# Patient Record
Sex: MALE
[De-identification: names, ages, dates, MRNs, and addresses within clinical notes are randomized per-mention and may not be internally consistent; named-entity substitution may affect disease eponyms.]

---

## 2020-05-16 ENCOUNTER — HOSPITAL ENCOUNTER (EMERGENCY)
Dept: HOSPITAL 88 - FSED | Age: 25
Discharge: HOME | End: 2020-05-16
Payer: COMMERCIAL

## 2020-05-16 VITALS — HEIGHT: 65 IN | BODY MASS INDEX: 43.32 KG/M2 | WEIGHT: 260 LBS

## 2020-05-16 DIAGNOSIS — R42: Primary | ICD-10-CM

## 2020-05-16 DIAGNOSIS — H66.92: ICD-10-CM

## 2020-05-16 DIAGNOSIS — E86.0: ICD-10-CM

## 2020-05-16 DIAGNOSIS — F41.9: ICD-10-CM

## 2020-05-16 DIAGNOSIS — E87.6: ICD-10-CM

## 2020-05-16 PROCEDURE — 99284 EMERGENCY DEPT VISIT MOD MDM: CPT

## 2020-05-16 PROCEDURE — 93005 ELECTROCARDIOGRAM TRACING: CPT

## 2020-05-16 PROCEDURE — 80053 COMPREHEN METABOLIC PANEL: CPT

## 2020-05-16 PROCEDURE — 85025 COMPLETE CBC W/AUTO DIFF WBC: CPT

## 2020-05-16 NOTE — XMS REPORT
Encounter CCD: 2012 to 2012

                             Created on: 2054



LE KEY

External Reference #: [id number]

: 1995

Sex: Male



Demographics





                          Address                   [address] [place]  [address]-

 

                          Home Phone                [phone]

 

                          Preferred Language        Unknown

 

                          Marital Status            Single

 

                          Druze Affiliation     None

 

                          Race                      Other

 

                          Ethnic Group              /Latin





Author





                          Author                    [employer]              [facility]

 

                          Address                   Unknown

 

                          Phone                     Unavailable







Care Team Providers





                    Care Team Member Name Role                Phone

 

                    Fletcher Thornton CP                  [phone]







Allergies, Adverse Reactions, Alerts





  



                     Substance           Reaction            Status

 

  



                           NKDA                      Active







Medications





    



              Medication   Instructions  Start Date   End Date     Status

 

    



                 Tylenol with Codeine  10 cc, PO, Q4H, PRN, 240 mL, pain,        Ordered



                           120 mg-12 mg/5 mL         Substitution Allowed, Maint

enance   



                                         oral liquid    

 

    



              Toradol 30 mg/mL  30 mg, Route: IM, Drug form: INJ,  2012   

Completed



                           injectable solution       ONCE, Start date: 12 

8:37:00,   



                                         Stop date: 12 8:37:00   







Vital Signs





                          Most recent to oldest [Reference Range]: 1

 

                          Height                    165.10 cm 

                                        (2012 06:39:00)  

 

                          Weight                    92.727 kg 

                                        (2012 06:39:00)  







Results





URINALYSIS



                          Most recent to oldest [Reference Range]: 1

 

                          UA Turbidity [Clear]      Clear 

                                        (2012 06:55:00)  

 

                          UA Color [Yellow]         Yellow 

*NA*

                                        (2012 06:55:00)  

 

                          UA pH [5.0-8.0]           7.5 

                                        (2012 06:55:00)  

 

                          UA Spec Grav [<=1.030]    1.010 

                                        (2012 06:55:00)  

 

                          UA Glucose [Negative]     Negative 

                                        (2012 06:55:00)  

 

                          UA Blood [Negative]       Large 

*ABN*

                                        (2012 06:55:00)  

 

                          UA Ketones [Negative]     Negative 

*NA*

                                        (2012 06:55:00)  

 

                          UA Protein [Negative]     Trace 

*ABN*

                                        (2012 06:55:00)  

 

                          UA Urobilinogen [0.1-1.0 EU/dL] 0.2 EU/dL 

                                        (2012 06:55:00)  

 

                          UA Bili [Negative]        Negative 

*NA*

                                        (2012 06:55:00)  

 

                          UA Leuk Est [Negative]    Negative 

                                        (2012 06:55:00)  

 

                          UA Nitrite [Negative]     Negative 

                                        (2012 06:55:00)  

 

                          UA WBC [None Seen]        None Seen 

                                        (2012 06:55:00)  

 

                          UA RBC [0-2 /HPF]         6-10 /HPF 

*ABN*

                                        (2012 06:55:00)  

 

                          UA Bacteria [None Seen /HPF] Occasional /HPF 

                                        (2012 06:55:00)  

 

                          UA Sq Epi [Few /LPF]      Few /LPF 

                                        (2012 06:55:00)

## 2020-05-16 NOTE — XMS REPORT
Continuity of Care Document

                             Created on: 2020



LE KEY

External Reference #: [id number]

: 1995

Sex: Male



Demographics





                          Address                   [address] [place] [address]  [address]

 

                          Home Phone                [phone]

 

                          Preferred Language        English

 

                          Marital Status            Unknown

 

                          Yazdanism Affiliation     Unknown

 

                          Race                      Unknown

 

                          Ethnic Group              Unknown





Author





                          Author                    Spencer Keepio

 LE Valdovinos              [email or web address]

 

                          Address                   Unknown

 

                          Phone                     Unavailable







Care Team Providers





                    Care Team Member Name Role                Phone

 

                    [email or web address] Unavailable         Un

available



                                    



Problems

                    



                    Problem                         Status                      

   Onset Date       

                          Classification                         Date Reported  

         

                          Comments                         Source               

     

 

                    Tachycardia, unspecified                                    

              

2020         

 

                                                    [facility]     

             

 

 

                    Fever, unspecified                                          

        2020                  

     

                                                    [facility]     

               

 

                    SVT                         Active                         0

2020           

                                                                                

       

                                        [facility]                 

   

 

                          Discharge Diagnosis: Food poisoning                   

                          

                    2016      

                                                    [facility]                

    

 

                          Discharge Diagnosis: Acute infective gastroenteritis  

                          

                          2016                                                  

[facility]        

           

 

                    S.O.B                         Active                        

 2016         

                                                                                

     

                                        [facility]                    

 

                    VOMITING                         Active                     

    2013      

                                                                                

  

                                        [facility]                    

 

                    KIDNEY STONES                         Active                

         2012 

                                                                                

                                        [facility]                    

 

                    LT SIDE  PAIN                         Active                

         2012 

                                                                                

                                        [facility]                    

 

                    HEADACHE                         Active                     

    2012      

                                                                                

  

                                        [facility]                 

   



                                                                                
                                                                                
                                                      



Medications

                    



                    Medication                         Details                  

       Route        

                          Status                         Patient Instructions   

          

                          Ordering Provider                         Order Date  

               

                                        Source                    

 

                          Isolyte S PH-7.4 (Bolus) IV                         1,

000 mL, Route: IV, ONCE, 

Priority: STAT, Dosing Weight 118.182 kg, Start date: 20 16:36:00 CST, 
Stop date: 20 16:36:00 CST                                                

                    Inactive                                                    

                   

                          2020                         [facility]

nter                   



 

                          Isolyte S PH-7.4 (Bolus) IV                         No

candace: (Same as: Isolyte S 

PH7.4, Normosol-R PH 7.4, Plasma-Lyte A )                                       

                    Inactive                                                    

          

                          2020                         [facility]

nter          

         

 

                          Isolyte S PH-7.4 (Bolus) IV                         No

candace: (Same as: Isolyte S 

PH7.4, Normosol-R PH 7.4, Plasma-Lyte A )                                       

                    Inactive                                                    

          

                          2020                         [facility]

nter          

         

 

                          Tylenol                         Notes: Max acetaminoph

en 4000 mg/day (4 gm/day).

 (Same as: Tylenol Extra Strength)                                              

                    Inactive                                                    

                 

                          2020                         [facility]

nter                 

  

 

                          Sodium Chloride 0.154 MEQ/ML Injectable Solution      

                   1,000 

mL, 1,000 ml/hr, Infuse Over: 1 Hour, Route: IV, ONCE, Priority: STAT, Dosing 
Weight 100 kg, Start date: 16 21:21:00, Duration: 1 doses or times, Stop 
date: 16 21:21:00                                                   

Inactive                                                                        

 

                          2016                         [facility]       

             

 

                          Sodium Chloride 0.154 MEQ/ML Injectable Solution      

                   1,000 

mL, 1,000 ml/hr, Infuse Over: 1 hr, Route: IV, ONCE, Priority: STAT, Dosing 
Weight 100 kg, Start date: 16 21:12:00, Duration: 1 doses or times, Stop 
date: 16 21:12:00                                                   

Inactive                                                                        

 

                          2016                         [facility]       

             

 

                                        Atropine Sulfate 0.025 MG / Diphenoxylat

e Hydrochloride 2.5 MG Oral Tablet 

[Lomotil]                               1 tab, PO, QID, PRN for loose stool, X 7

 day, 

# 28 tab, 0 Refill(s)                                                   Active  

 

                                                                        

2016                              [facility]                    

 

                          promethazine 25 mg oral tablet                        

 25 mg = 1 tab, PO, Q8H, 

PRN Nausea/Vomiting, X 10 day, # 30 tab, 0 Refill(s)                            

                      Active                                                    

                          2016                         [facility]       

   

          

 

                    Bentyl                         Notes: (Same as: Bentyl)     

                    

                          Inactive                                              

 

                                             2016                         

[facility]     

               

 

                          Morphine                         Notes: (Same as:MORPh

ine Sulfate)              

                                             Inactive                           

         

                                             2016                         

[facility]                    

 

                          Ondansetron                         Notes: (Same as: CHIOMA luna)   *** MEDICATION 

WASTE *** Product Size:  4 mg Product Wasted:  ___ mg                           

                          Inactive                                              

   

                                             2016                         

[facility]       

             

 

                          Sodium Chloride 0.154 MEQ/ML Injectable Solution      

                   1,000 

mL, 1000 ml/hr, Infuse Over: 1 hr, Route: IV, 1,000, Drug form: INJ, ONCE, 
Priority: STAT, Dosing Weight 100 kg, Start date: 16 16:20:00, Duration: 1
 doses or times, Stop date: 16 16:20:00                                   

                    Inactive                                                    

     

                          2016                         [facility]       

        

     

 

                          Saline Flush 0.9%                         Notes: (Same

 as: BD Posiflush)        

                                                    No Longer Active            

          

                                                                      2016

                 

                                        [facility]                    

 

                          Zofran ODT 4 mg oral tablet, disintegrating           

              4 mg = 1 

tab, PO, BID, Nausea and Vomiting, Dissolve tab under tongue, # 10 tab, 0 
Refill(s)Dissolve tab under tongue                                              

                    Active                                                  Joanna dexter             

                          2013                         [facility]       

             

 

                          Zofran                         4 mg, 2 mL, Route: IVP,

 Drug form: INJ, ONCE, 

Dosing Weight 87.273, kg, Priority: STAT, Start date: 13 5:34:00, Stop 
date: 13 5:34:00(Same as: Zofran)                                         

                    Inactive                                                  

wesley      

                          2013                         [facility]       

       

      

 

                          Sodium Chloride 0.9% (Bolus) IV 1,000 mL              

           1,000 mL, Rate:

 1,000 ml/hr, Infuse over: 1 hr, Route: IV, Dosing Weight 87.273 kg, Total 
Volume: 1,000, Priority: STAT, Start date: 13 5:34:00, Duration: 1 doses 
or times, Stop date: 13 6:33:00, Bolus DoseBolus Dose                     
                                             Inactive                           

                

                    [facility]                         2013                  

       [facility]                    

 

                          Toradol 30 mg/mL injectable solution                  

       60 mg, Route: IM, 

Drug form: INJ, ONCE, Start date: 12 21:41:00, Stop date: 12 
21:41:00                         IM                         No Longer Active    

                                              Rastafari                         

2012                              [facility]                    

 

                          Toradol 30 mg/mL injectable solution                  

       30 mg, Route: IV, 

Drug form: INJ, ONCE, Start date: 12 21:35:00, Stop date: 12 
21:35:00                         IV                         No Longer Active    

                                              Rastafari                         

2012                              [facility]                    

 

                          Toradol 10 mg oral tablet                         10 m

g, 1 tab, PO, Q4H, PRN, 30

 tab, Pain, Substitution Allowed, TAB                         PO                

                    Active                                                  Susana

          

                          2012                         [facility]       

           

  

 

                          Flomax 0.4 mg oral capsule                         0.4

 mg, 1 cap, PO, Daily, 10 

cap, Substitution Allowed, CAP                         PO                       

                    Active                                                  Susana

                 

                          2012                         [facility]       

             

 

                          Norco 5/325 oral tablet                         1-2 ta

b, PO, Q4-6H, PRN, 30 tab,

 Pain, Substitution Allowed, Maintenance                         PO             

                    Active                                                  Susana

m       

                          2012                         [facility]       

        

     

 

                          Tylenol with Codeine 120 mg-12 mg/5 mL oral liquid    

                     10 

cc, PO, Q4H, PRN, 240 mL, pain, Substitution Allowed, Maintenance               
                    PO                         Active                           

          

                    Nriagu                         2012                   

      

 [facility]                    

 

                          Toradol 30 mg/mL injectable solution                  

       30 mg, Route: IM, 

Drug form: INJ, ONCE, Start date: 12 8:37:00, Stop date: 12 8:37:00 
                          IM                         No Longer Active           

  

                                             Nriagu                         03/0

3/2012  

                                         [facility]                    



                                                                                
                                                                                
                                                                                
                                                                                
                                                                                
                                    



Allergies, Adverse Reactions, Alerts

                    



                    Substance                         Category                  

       Reaction     

                          Severity                         Reaction type        

      

                    Status                         Date Reported                

         

Comments                                Source                    

 

                          No Known Medication Allergies                         

Assertion                 

                                                                      Drug aller

gy          

                                                                                

     

                                        [facility]                 

   



                                                        



Immunizations

        



                                        No Data Provided for This Section



                                    



Results





                    Order Name                         Results                  

       Value        

                          Reference Range                         Date          

         

                    Interpretation                         Comments             

            

Source                    

 

                ELECTROLYTES                         AGAP            16.7       

                  10.0 - 

20.0                         2020                                         

                                                    [facility]     

               

 

                ELECTROLYTES                         Glucose Lvl     116        

                 70 

- 99                         2020                                         

                                                    [facility]     

               

 

                ELECTROLYTES                         BUN             14         

                7 - 22      

                    2020                                                  

 

                                        [facility]                 

   

 

                    ELECTROLYTES                         Creatinine Lvl      1.0

6                        

                    0.50 - 1.40                         2020              

                   

                                                    [facility]     

          

     

 

                ELECTROLYTES                         Sodium Lvl      137        

                 135 

- 145                         2020                                        

                                                    [facility]     

               

 

                ELECTROLYTES                         Potassium Lvl   3.7        

                 

3.5 - 5.1                         2020                                    

                                                    [facility]     

             

  

 

                ELECTROLYTES                         Chloride Lvl    103        

                 95

 - 109                         2020                                       

                                                    [facility]     

               

 

                ELECTROLYTES                         CO2             21         

                24 - 32     

                    2020                                                  

                                        [facility]                 

   

 

                ELECTROLYTES                         Calcium Lvl     8.3        

                 8.5

 - 10.5                         2020                                      

                                                    [facility]     

               

 

                ELECTROLYTES                         eGFR            97         

                           

                    2020                                                  

Result

 Comment: The eGFR is calculated using the CKD-EPI formula. In most young, 
healthy individuals the eGFR will be >90 mL/min/1.73m2. The eGFR declines with 
age. An eGFR of 60-89 may be normal in some populations, particularly the 
elderly, for whom the CKD-EPI formula has not been extensively validated. Use of
 the eGFR is not recommended in the following populations:<br/><br/>Individuals 
with unstable creatinine concentrations, including pregnant patients and those 
with serious co-morbid conditions.<br/><br/>Patients with extremes in muscle 
mass or diet. <br/><br/>The data above are obtained from the National Kid[employer] 
Disease Education Program (NKDEP) which additionally recommends that when the 
eGFR is used in patients with extremes of body mass index for purposes of drug 
dosing, the eGFR should be multiplied by the estimated BMI.                     
                                        [facility]                 

   

 

                HEMATOLOGY                         WBC             8.5          

               3.7 - 10.4   

                          2020                                            

    

                                                    [facility]     

               

 

                HEMATOLOGY                         RBC             4.95         

                4.70 - 6.10 

                          2020                                            

  

                                                    [facility]     

               

 

                HEMATOLOGY                         Hgb             14.9         

                14.0 - 18.0 

                          2020                                            

  

                                                    [facility]     

               

 

                HEMATOLOGY                         Hct             42.5         

                42.0 - 54.0 

                          2020                                            

  

                                                    [facility]     

               

 

                HEMATOLOGY                         MCV             85.8         

                80.0 - 94.0 

                          2020                                            

  

                                                    [facility]     

               

 

                HEMATOLOGY                         MCH             30.0         

                27.0 - 31.0 

                          2020                                            

  

                                                    [facility]     

               

 

                HEMATOLOGY                         MCHC            34.9         

                32.0 - 36.0

                          2020                                            

 

                                                    [facility]     

               

 

                HEMATOLOGY                         RDW             13.8         

                11.5 - 14.5 

                          2020                                            

  

                                                    [facility]     

               

 

                HEMATOLOGY                         Platelet        201          

               133 - 

450                         2020                                          

                                                    [facility]     

               

 

                HEMATOLOGY                         MPV             8.0          

               7.4 - 10.4   

                          2020                                            

    

                                                    [facility]     

               

 

                HEMATOLOGY                         Segs            73.7         

                45.0 - 75.0

                          2020                                            

 

                                                    [facility]     

               

 

                HEMATOLOGY                         Lymphocytes     13.8         

                20.0

 - 40.0                         2020                                      

                                                    [facility] [facility]     

               

 

                HEMATOLOGY                         Monocytes       10.7         

                2.0 - 

12.0                         2020                                         

                                                    [facility]     

               

 

                HEMATOLOGY                         Eosinophils     1.1          

               0.0 -

 4.0                         2020                                         

                                                    [facility]     

               

 

                HEMATOLOGY                         Basophils       0.7          

               0.0 - 

1.0                         2020                                          

                                                    [facility]     

               

 

                HEMATOLOGY                         Neutrophils #   6.3          

               1.5

 - 8.1                         2020                                       

                                                    [facility]     

               

 

                HEMATOLOGY                         Lymphocytes #   1.2          

               1.0

 - 5.5                         2020                                       

                                                    [facility]     

               

 

                HEMATOLOGY                         Monocytes #     0.9          

               0.0 -

 0.8                         2020                                         

                                                    [facility]     

               

 

                HEMATOLOGY                         Eosinophils #   0.1          

               0.0

 - 0.5                         2020                                       

                                                    [facility]     

               

 

                HEMATOLOGY                         Basophils #     0.1          

               0.0 -

 0.2                         2020                                         

                                                    [facility]     

               

 

                CHEM PANEL                         Lipase Lvl      116          

               73 - 

393                         2016                                          

                                                    [facility]                

    

 

                CHEM PANEL                         eGFR            99           

                           

                    2016                                                  

Result 

Comment: The eGFR is calculated using the CKD-EPI formula. In most young, 
healthy individuals the eGFR will be >90 mL/min/1.73m2. The eGFR declines with 
age. An eGFR of 60-89 may be normal in some populations, particularly the 
elderly, for whom the CKD-EPI formula has not been extensively validated. Use of
 the eGFR is not recommended in the following populations:<br/><br/>Individuals 
with unstable creatinine concentrations, including pregnant patients and those 
with serious co-morbid conditions.<br/><br/>Patients with extremes in muscle 
mass or diet. <br/><br/>The data above are obtained from the National Kid[employer] 
Disease Education Program (NKDEP) which additionally recommends that when the 
eGFR is used in patients with extremes of body mass index for purposes of drug 
dosing, the eGFR should be multiplied by the estimated BMI.                     
                                         [place]                    

 

                CHEM PANEL                         CO2             23           

              24 - 32       

                    2016                                                  

  

                                        [facility]                    

 

                CHEM PANEL                         Calcium Lvl     9.1          

               8.5 -

 10.5                         2016                                        

                                                    [facility]                

    

 

                CHEM PANEL                         Total Protein   8.7          

               6.4

 - 8.4                         2016                                       

                                                    [facility]                

    

 

                CHEM PANEL                         Chloride Lvl    104          

               95 -

 109                         2016                                         

                                                    [facility]                

    

 

                CHEM PANEL                         Potassium Lvl   4.0          

               3.5

 - 5.1                         2016                                       

                                                    [facility]                

    

 

                CHEM PANEL                         Creatinine Lvl  1.07         

                

0.50 - 1.40                         2016                                  

                                                    [facility]                

    

 

                CHEM PANEL                         Sodium Lvl      136          

               135 - 

145                         2016                                          

                                                    [facility]                

    

 

                CHEM PANEL                         Glucose Lvl     118          

               70 - 

99                         2016                                           

                                                    [facility]                

    

 

                CHEM PANEL                         BUN             19           

              7 - 22        

                    2016                                                  

   

                                         [facility]                    

 

                CHEM PANEL                         ALT             58           

              0 - 65        

                    2016                                                  

   

                                        [facility]                    

 

                CHEM PANEL                         AST             20           

              0 - 37        

                    2016                                                  

   

                                        [facility]                    

 

                CHEM PANEL                         Alk Phos        149          

               39 - 136

                          2016                                            

 

                                                    [facility]                

    

 

                CHEM PANEL                         Bili Total      1.0          

               0.2 - 

1.3                         2016                                          

                                                    [facility]                

    

 

                CHEM PANEL                         Albumin Lvl     4.5          

               3.5 -

 5.0                         2016                                         

                                                    [facility]                

    

 

                CHEM PANEL                         B/C Ratio       18           

              6 - 25  

                          2016                                            

   

                                                    [facility]                

    

 

                CHEM PANEL                         Globulin        4.2          

               2.0 - 

4.0                         2016                                          

                                                    [facility]                

    

 

                CHEM PANEL                         A/G Ratio       1.1          

               0.7 - 

1.6                         2016                                          

                                                    [facility]                

    

 

                CHEM PANEL                         AGAP            13.0         

                10.0 - 20.0

                          2016                                            

 

                                                    [facility]                

    

 

                HEMATOLOGY                         Monocytes       4.5          

               2.0 - 

12.0                         2016                                         

                                                    [facility]                

    

 

                HEMATOLOGY                         Eosinophils     0.3          

               0.0 -

 4.0                         2016                                         

                                                    [facility]                

    

 

                HEMATOLOGY                         Basophils       0.2          

               0.0 - 

1.0                         2016                                          

                                                    [facility]                

    

 

                HEMATOLOGY                         Lymphocytes     5.1          

               20.0 

- 40.0                         2016                                       

                                                    [facility]                

    

 

                HEMATOLOGY                         Segs            89.9         

                45.0 - 75.0

                          2016                                            

 

                                                    [facility]                

    

 

                HEMATOLOGY                         Lymphocytes #   0.9          

               1.0

 - 5.5                         2016                                       

                                                    [facility]                

    

 

                HEMATOLOGY                         Monocytes #     0.8          

               0.0 -

 0.8                         2016                                         

                                                    [facility]                

    

 

                HEMATOLOGY                         Segs-Bands #    15.4         

                1.5

 - 8.1                         2016                                       

                                                    [facility]                         MPV             8.2          

               7.4 - 10.4   

                          2016                                            

    

                                                    [facility]                         RDW             13.7         

                11.5 - 14.5 

                          2016                                            

  

                                                    [facility]                         MCHC            33.8         

                32.0 - 36.0

                          2016                                            

 

                                                    [facility]                         Platelet        261          

               133 - 

450                         2016                                          

                                                    [facility]                         MCH             28.5         

                27.0 - 31.0 

                          2016                                            

  

                                                    [facility]                         MCV             84.4         

                80.0 - 94.0 

                          2016                                            

  

                                                    [facility]                         Hgb             17.7         

                14.0 - 18.0 

                          2016                                            

  

                                                    [facility]                

    

 

                HEMATOLOGY                         Hct             52.3         

                42.0 - 54.0 

                          2016                                            

  

                                                    [facility]                         WBC             17.1         

                3.7 - 10.4  

                          2016                                            

   

                                                    [facility]                         RBC             6.20         

                4.70 - 6.10 

                          2016                                            

  

                                                    [facility]                

    

 

                    URINALYSIS                         UA Bacteria         Occas

ional /HPF 

                          (2012 06:55:00)                           None S

een                       

                    2012                         Normal                   

                  

                                        [facility]                    

 

                    URINALYSIS                         UA RBC              6-10 

/HPF 

*ABN*

                    (2012 06:55:00)                           0 - 2       

                  

2012                         ABN                                          

                                        [facility]                    

 

                    URINALYSIS                         UA WBC              None 

Seen 

                          (2012 06:55:00)                           None S

een                       

                    2012                         Normal                   

                  

                                        [facility]                    

 

                    URINALYSIS                         UA Sq Epi           Few /

LPF 

                    (2012 06:55:00)                           Few         

                

2012                         Normal                                       

                                        [facility]                    

 

                URINALYSIS                         UA Spec Grav    1.010        

                 

<=1.030                         2012                         Normal       

                                                    [facility]                

    

 

                URINALYSIS                         UA pH           7.5          

               5.0 - 8.0  

                          2012                         Normal             

   

                                                    [facility]                

    

 

                    URINALYSIS                         UA Protein          Trace

 

*ABN*

                          (2012 06:55:00)                           Negati

ve                        

                    2012                         ABN                      

                   

                                        [facility]                    

 

                    URINALYSIS                         UA Glucose          Negat

bo 

                          (2012 06:55:00)                           Negati

ve                        

                    2012                         Normal                   

                   

                                        [facility]                    

 

                    URINALYSIS                         UA Ketones          Negat

bo 

*NA*

                          (2012 06:55:00)                           Negati

ve                        

                    2012                         NA                       

                   

                                        [facility]                    

 

                    URINALYSIS                         UA Bili             Negat

bo 

*NA*

                          (2012 06:55:00)                           Negati

ve                        

                    2012                         NA                       

                   

                                        [facility]                    

 

                    URINALYSIS                         UA Blood            Large

 

*ABN*

                          (2012 06:55:00)                           Negati

ve                        

                    2012                         ABN                      

                   

                                        [facility]                    

 

                    URINALYSIS                         UA Leuk Est         Negat

bo 

                          (2012 06:55:00)                           Negati

ve                        

                    2012                         Normal                   

                   

                                        [facility]                    

 

                    URINALYSIS                         UA Nitrite          Negat

bo 

                          (2012 06:55:00)                           Negati

ve                        

                    2012                         Normal                   

                   

                                        [facility]                    

 

                URINALYSIS                         UA Urobilinogen 0.2          

               

0.1 - 1.0                         2012                         Normal     

                                                    [facility]                

    

 

                    URINALYSIS                         UA Color            Yello

w 

*NA*

                    (2012 06:55:00)                           Yellow      

                   

2012                         NA                                           

                                        [facility]                    

 

                    URINALYSIS                         UA Turbidity        Clear

 

                    (2012 06:55:00)                           Clear       

                  

2012                         Normal                                       

                                        [facility]                    



                                                                                
                                                                                
                                                                                
                                                                                
                                                                                
                                                                                
                                                                                
                                                                                
                                                                                
                                                



Pathology Reports





                                        No Data Provided for This Section       

             



                                                



Diagnostic Reports

                    



                    Report                         Value                        

 Date               

                                        Source                    

 

                          Chest 1view DX                         EXAM: XR CHEST 

1 VIEW

DATE: 1/3/2020 at 1420 hours

 

INDICATION:  - cough

COMPARISON: 2016

TECHNIQUE: Upright AP chest.

FINDINGS: 

Lines, tubes and hardware: None.

 

Lungs and pleura: Pulmonary vascularity is normal. The lungs are clear. The 
costophrenic sulci are sharp without effusion. No pneumothorax is identified.

Heart and mediastinum: The heart size is normal for technique. The mediastinal 
contours are normal. 

Bones, soft tissues: No acute abnormality.

IMPRESSION:  

No acute abnormality.



                          2020                         [facility]                    

 

                          Chest 2 views DX                         Patient Name:

 LE KEY

Age: 20 years  y/o Male

MR: [id number]

Study: Chest 2 views DX dated 2016.

Clinical Indication: Shortness of Breath

Comparison: 2010.

Cardiac and mediastinal structures are stable. 

No focal infiltrate identified within the lungs, no edema, no pleural effusions 
and no pneumothorax.





SL:  [id number]

                          2016                         [facility]       

 

            



                                                                                
    



Consultation Notes

                    



                                        No Data Provided for This Section       

             



                                                            



Discharge Summaries

                    



                                        No Data Provided for This Section       

             



                                                            



History and Physicals

                    



                                        No Data Provided for This Section       

             



                                                                



Vital Signs

                     



                    Vital Sign                         Value                    

     Date           

                          Comments                         Source               

     

 

                    Temperature Oral (F)                         100.4 F        

                 

2020                                                   [facility]                    

 

                    Respitory Rate                         16                   

       2020   

                                                    [facility]     

     

          

 

                    Systolic (mm Hg)                         147                

          2020

                                                    [facility]     

  

             

 

                    Diastolic (mm Hg)                         66                

          2020

                                                    [facility]     

  

             

 

                    Respitory Rate                         15                   

       2020   

                                                    [facility]     

     

          

 

                    Systolic (mm Hg)                         142                

          2020

                                                    [facility]     

  

             

 

                    Diastolic (mm Hg)                         59                

          2020

                                                    [facility]     

  

             

 

                    Respitory Rate                         17                   

       2020   

                                                    [facility]     

     

          

 

                    Systolic (mm Hg)                         146                

          2020

                                                    [facility]     

  

             

 

                    Diastolic (mm Hg)                         70                

          2020

                                                    [facility]     

  

             

 

                    Temperature Oral (F)                         100 F          

               

2020                                                   [facility]                    

 

                    Heart Rate                         161                      

    2020      

                                                    [facility]     

        

       

 

                    Temperature Oral (F)                         101.7 F        

                 

2020                                                   [facility]                    

 

                    Weight                         118.182                      

    2020      

                                                    [facility]     

        

       

 

                    Respitory Rate                         16                   

       2016   

                                                    [facility]                

    

 

                    Heart Rate                         119                      

    2016      

                                                    [facility]                

    

 

                    Systolic (mm Hg)                         125                

          2016

                                                    [facility]                

  

  

 

                    Diastolic (mm Hg)                         76                

          2016

                                                    [facility]                

  

  

 

                    Temperature Oral (F)                         98.5 F         

                

2016                                                   [facility]       

 

            

 

                    Temperature Oral (F)                         99.1 F         

                

2016                                                   [facility]       

 

            

 

                    Respitory Rate                         18                   

       2016   

                                                    [facility]                

    

 

                    Heart Rate                         125                      

    2016      

                                                    [facility]                

    

 

                    Systolic (mm Hg)                         113                

          2016

                                                    [facility]                

  

  

 

                    Diastolic (mm Hg)                         62                

          2016

                                                    [facility]                

  

  

 

                    BMI Calculated                         36.69                

          2016

                                                    [facility]                

  

  

 

                    Weight                         100                          

2016          

                                                    [facility]                

    

 

                    Temperature Oral (F)                         98.2 F         

                

2016                                                   [facility]       

 

            

 

                    Respitory Rate                         20                   

       2016   

                                                    [facility]                

    

 

                    Height                         165.1 cm                     

    2016      

                                                    [facility]                

    

 

                    Heart Rate                         127                      

    2016      

                                                    [facility]                

    

 

                    Systolic (mm Hg)                         110                

          2016

                                                    [facility]                

  

  

 

                    Diastolic (mm Hg)                         62                

          2016

                                                    [facility]                

  

  

 

                    Heart Rate                         88                       

   2013       

                                                    [facility]                

    

 

                    Respitory Rate                         18                   

       2013   

                                                    [facility]                

    

 

                    Temperature Oral (F)                         98.5 F         

                

2013                                                   [facility]       

 

            

 

                    Diastolic (mm Hg)                         78                

          2013

                                                    [facility]                

  

  

 

                    Systolic (mm Hg)                         120                

          2013

                                                    [facility]                

  

  

 

                    Height                         165.1 cm                     

    2013      

                                                    [facility]                

    

 

                    Weight                         87.273                       

   2013       

                                                    [facility]                

    

 

                    Temperature Oral (F)                         99.1 F         

                

2013                                                   [facility]       

 

            

 

                    Heart Rate                         122                      

    2013      

                                                    [facility]                

    

 

                    Respitory Rate                         18                   

       2013   

                                                    [facility]                

    

 

                    Systolic (mm Hg)                         122                

          2013

                                                    [facility]                

  

  

 

                    Diastolic (mm Hg)                         84                

          2013

                                                    [facility]                

  

  

 

                    Weight                         92.727                       

   2012       

                                                    [facility]                

    

 

                    Height                         165.10 cm                    

     2012     

                                                    [facility]                

    

 

                    Weight                         92.727                       

   2012       

                                                    [facility]                

    

 

                    Height                         165.10 cm                    

     2012     

                                                    [facility]                

    

 

                    Heart Rate                         100                      

    2012      

                                                    [facility]     

        

       

 

                    Temperature Oral (F)                         97.7 F         

                

2012                                                   [facility]                    

 

                    Systolic (mm Hg)                         134                

          2012

                                                    [facility]     

  

             

 

                    Diastolic (mm Hg)                         75                

          2012

                                                    [facility]     

  

             

 

                    Respitory Rate                         20                   

       2012   

                                                    [facility]     

     

          

 

                    Weight                         94.545                       

   2012       

                                                    [facility]     

         

      

 

                    Height                         165.10 cm                    

     2012     

                                                    [facility]     

       

        

 

                    Diastolic (mm Hg)                         68                

          2012

                                                    [facility]     

  

             

 

                    Systolic (mm Hg)                         138                

          2012

                                                    [facility]     

  

             

 

                    Respitory Rate                         20                   

       2012   

                                                    [facility]     

     

          

 

                    Heart Rate                         111                      

    2012      

                                                    [facility]     

        

       

 

                    Temperature Oral (F)                         98.8 F         

                

2012                                                   [facility]                    



                                                                                
                                                                                
                                                                                
                                                                                
                                                                                
                                                                                
                                                                                
                                                                                
                                                                                
                                                                                
                                                                                
                                                                                
                                                                        



Encounters

                    



                    Location                         Location Details           

              

Encounter Type                         Encounter Number                         

Reason For Visit                         Attending Provider                     

                    ADM Date                         DC Date                    

     Status     

                                        Source                    

 

                    [facility]                                     

             

Emergency                         [id number]                                  

                          MARIANELA QUINONES                          2012                         Discharged         

       

                                        [facility]                                                

  Emergency         

                    [id number]                                                

  

ELHAM BAXTER                          2012                         Discharged                         [facility]                                                

  Emergency         

                    [id number]                                                

  

MARGARITA GUERRA                          2012                         Discharged                         [facility]                                                

  Emergency         

                    [id number]                                                

  

ISAIAH CHAO                          2013                         Discharged                         [facility]                         [id number]

05                   

                                             [facility]                   

       

2016                                   

                                        [facility]                                   

               

Emergency                         [id number]                                  

                          Luis Espitia                          2020                                                  

[facility]                    



                                                                                
                                                    



Procedures

        



                                        No Data Provided for This Section



                                                    



Assessment and Plan

                    



                                        No Data Provided for This Section       

             



                                     



Plan of Care





                                        No Data Provided for This Section       

             



                                                                



Social History

                    



                    Social History                         Date                 

        Source      

              

 

                                        Social History TypeResponse

Smoking Status

Never smoker; Type: Cigarettes; Exposure to Tobacco Smoke None; Cigarette 
Smoking Last 365 Days No; Reg Smoking Cessation Counseling No

entered on: 1/3/20

                          2020                         [facility]                    

 

                                        Social History TypeResponse

Smoking Status

Never smoker; Type: Cigarettes; Exposure to Tobacco Smoke None; Cigarette 
Smoking Last 365 Days No; Reg Smoking Cessation Counseling No

                          2016                         [facility]       

 

            



                                                                                
    



Family History

                    



                                        No Data Provided for This Section       

             



                                                            



Advance Directives

                    



                                        No Data Provided for This Section       

             



                                                            



Functional Status

                    



                                        No Data Provided for This Section

## 2020-05-16 NOTE — XMS REPORT
Summary of Care: 1/3/20 - 1/3/20

                             Created on: 2020



LE KEY

External Reference #: [id number]

: 1995

Sex: Male



Demographics





                          Address                   [address] [place]  [address]

 

                          Home Phone                [phone]

 

                          Preferred Language        English

 

                          Marital Status            Single

 

                          Quaker Affiliation     None

 

                          Race                      Other

 

                                        Additional Race(s)  

 

                          Ethnic Group              /Latin





Author





                          Author                    [facility]

 

                          Organization              [facility]

 

                          Address                   Unknown

 

                          Phone                     Unavailable







Encounter





MIKEL Greenwood(JOSIE) [id number] Date(s): 1/3/20 - 1/3/20

[facility] [address] provided by         
  [employer] [facility] [employer]       [place] [address]-    
[phone]

Encounter Diagnosis

Sinus tachycardia (Discharge Diagnosis) - 1/3/20

Fever (Discharge Diagnosis) - 1/3/20

Discharge Disposition: Home or Self Care

Attending Physician: Luis Espitia MD





Vital Signs





                    1                   2                   3



                                         Most recent to   



                                         oldest [Reference   



                                         Range]:   

 

                                        100.4 DegF 

*HI*

                          (1/3/20 5:36 PM)          100 DegF 

*HI*

                          (1/3/20 3:13 PM)          101.7 DegF 

*HI*

                                        (1/3/20 1:23 PM)



                                         Temperature Oral   



                                         [96.4-99.1 DegF]   

 

                                        147/66 mmHg 

*HI*

                          (1/3/20 5:36 PM)          142/59 mmHg 

*HI*

                          (1/3/20 4:29 PM)          146/70 mmHg 

*HI*

                                        (1/3/20 3:13 PM)



                                         Blood Pressure   



                                         [/60-90 mmHg]   

 

                                        16 BRMIN 

                          (1/3/20 5:36 PM)          15 BRMIN 

                          (1/3/20 4:29 PM)          17 BRMIN 

                                        (1/3/20 3:13 PM)



                                         Respiratory Rate   



                                         [14-20 BRMIN]   

 

                                        161 bpm 

*HI*

                    (1/3/20 1:23 PM)                         



                                         Peripheral Pulse   



                                         Rate [ bpm]   

 

                                        118.182 kg 

                    (1/3/20 1:23 PM)                         



                                         Weight   







Problem List





No data available for this section



Allergies, Adverse Reactions, Alerts





No Known Medication Allergies



Medications





Isolyte S PH-7.4 (Bolus) IV

1,000 mL, 1000 ml/hr, Infuse Over: 1 hr, Route: IV, 1,000, Drug form: SOLN, ONCE
, Priority: STAT, Dosing Weight 118.182 kg, Start date: 20 13:29:00 CST, S
top date: 20 13:29:00 CST, 0

Notes: (Same as: Isolyte S PH7.4, Normosol-R PH 7.4, Plasma-Lyte A )

Start Date: 1/3/20

Stop Date: 1/3/20

Status: Completed



Isolyte S PH-7.4 (Bolus) IV

1,000 mL, 1000 ml/hr, Infuse Over: 1 hr, Route: IV, 1,000, Drug form: SOLN, ONCE
, Priority: STAT, Dosing Weight 118.182 kg, Start date: 20 14:38:00 CST, S
top date: 20 14:38:00 CST, 0

Notes: (Same as: Isolyte S PH7.4, Normosol-R PH 7.4, Plasma-Lyte A )

Start Date: 1/3/20

Stop Date: 1/3/20

Status: Completed



Isolyte S PH-7.4 (Bolus) IV

1,000 mL, Route: IV, ONCE, Priority: STAT, Dosing Weight 118.182 kg, Start date:
20 16:36:00 CST, Stop date: 20 16:36:00 CST

Start Date: 1/3/20

Stop Date: 1/3/20

Status: Completed



Tylenol

1,000 mg, 2 tab, Route: PO, Drug form: TAB, ONCE, Dosing Weight 118.182, kg, Kisha
ority: STAT, Start date: 20 13:29:00 CST, Stop date: 20 13:29:00 CST
, 0

Notes: Max acetaminophen 4000 mg/day (4 gm/day).  (Same as: Tylenol Extra Streng
th)

Start Date: 1/3/20

Stop Date: 1/3/20

Status: Completed



Results









 



                           Most recent to            1



                                         oldest [Reference 



                                         Range]: 

 

 



                           Neutrophils #             6.3 K/CMM



                           [1.5-8.1 K/CMM]           (1/3/20 2:05 PM)

 

 



                           Lymphocytes #             1.2 K/CMM



                           [1.0-5.5 K/CMM]           (1/3/20 2:05 PM)

 

 



                           Monocytes # [0.0-0.8      0.9 K/CMM



                           K/CMM]                    *HI*



                                         (1/3/20 2:05 PM)

 

 



                           Eosinophils #             0.1 K/CMM



                           [0.0-0.5 K/CMM]           (1/3/20 2:05 PM)

 

 



                           Basophils # [0.0-0.2      0.1 K/CMM



                           K/CMM]                    (1/3/20 2:05 PM)

 

 



                           eGFR                      97 mL/min/1.73m2 1



                                         *NA*



                                         (1/3/20 2:05 PM)

 

 



                           AGAP [10.0-20.0           16.7 mEq/L



                           mEq/L]                    (1/3/20 2:05 PM)

 

 



                           Basophils [0.0-1.0        0.7 %



                           %]                        (1/3/20 2:05 PM)

 

 



                           BUN [7-22 mg/dL]          14 mg/dL



                                         (1/3/20 2:05 PM)

 

 



                           Calcium Lvl               8.3 mg/dL



                           [8.5-10.5 mg/dL]          *LOW*



                                         (1/3/20 2:05 PM)

 

 



                           Chloride Lvl [      103 mEq/L



                           mEq/L]                    (1/3/20 2:05 PM)

 

 



                           CO2 [24-32 mEq/L]         21 mEq/L



                                         *LOW*



                                         (1/3/20 2:05 PM)

 

 



                           Creatinine Lvl            1.06 mg/dL



                           [0.50-1.40 mg/dL]         (1/3/20 2:05 PM)

 

 



                           Eosinophils [0.0-4.0      1.1 %



                           %]                        (1/3/20 2:05 PM)

 

 



                           Glucose Lvl [70-99        116 mg/dL



                           mg/dL]                    *HI*



                                         (1/3/20 2:05 PM)

 

 



                           Hct [42.0-54.0 %]         42.5 %



                                         (1/3/20 2:05 PM)

 

 



                           Hgb [14.0-18.0 g/dL]      14.9 g/dL



                                         (1/3/20 2:05 PM)

 

 



                           Potassium Lvl             3.7 mEq/L



                           [3.5-5.1 mEq/L]           (1/3/20 2:05 PM)

 

 



                           Lymphocytes               13.8 %



                           [20.0-40.0 %]             *LOW*



                                         (1/3/20 2:05 PM)

 

 



                           MCH [27.0-31.0 pg]        30.0 pg



                                         (1/3/20 2:05 PM)

 

 



                           MCHC [32.0-36.0           34.9 g/dL



                           g/dL]                     (1/3/20 2:05 PM)

 

 



                           MCV [80.0-94.0 fL]        85.8 fL



                                         (1/3/20 2:05 PM)

 

 



                           Monocytes [2.0-12.0       10.7 %



                           %]                        (1/3/20 2:05 PM)

 

 



                           MPV [7.4-10.4 fL]         8.0 fL



                                         (1/3/20 2:05 PM)

 

 



                           Sodium Lvl [135-145       137 mEq/L



                           mEq/L]                    (1/3/20 2:05 PM)

 

 



                           Platelet [133-450         201 K/CMM



                           K/CMM]                    (1/3/20 2:05 PM)

 

 



                           Segs [45.0-75.0 %]        73.7 %



                                         (1/3/20 2:05 PM)

 

 



                           RBC [4.70-6.10            4.95 M/CMM



                           M/CMM]                    (1/3/20 2:05 PM)

 

 



                           RDW [11.5-14.5 %]         13.8 %



                                         (1/3/20 2:05 PM)

 

 



                           WBC [3.7-10.4 K/CMM]      8.5 K/CMM



                                         (1/3/20 2:05 PM)







1Result Comment: The eGFR is calculated using the CKD-EPI formula. In most 
young, healthy individuals the eGFR will be >90 mL/min/1.73m2. The eGFR declines
with age. An eGFR of 60-89 may be normal in some populations, particularly the 
elderly, for whom the CKD-EPI formula has not been extensively validated. Use of
the eGFR is not recommended in the following populations:



Individuals with unstable creatinine concentrations, including pregnant patients
and those with serious co-morbid conditions.



Patients with extremes in muscle mass or diet. 



The data above are obtained from the National Kidney Disease Education Program (
NKDEP) which additionally recommends that when the eGFR is used in patients with
extremes of body mass index for purposes of drug dosing, the eGFR should be mul
tiplied by the estimated BMI.



Immunizations





No data available for this section



Procedures





No data available for this section



Social History





 



                           Social History Type       Response

 

 



                           Smoking Status            Never smoker; Type: Cigaret

candace; Exposure to Tobacco Smoke None;

Cigarette



                                         Smoking Last 365 Days No; Reg Smoking C

essation Counseling No



                                         entered on: 1/3/20







Assessment and Plan





No data available for this section

## 2020-05-16 NOTE — EMERGENCY DEPARTMENT NOTE
History of Present Illnes


History of Present Illness


Chief Complaint:  room spining/lightheadedness


History of Present Illness


This is a 24 year old  male. was doing well prior to this. then rooming 

spinning, n/v x1, then lightheadedness, left ear fullness, then lightheadness, 

then anxiety attack


Historian:  Patient


Arrival Mode:  Car


History limited by:  condition of the patient (normal)


 Required:  No


Onset (how long ago):  day(s) (5)


Location:  see hpi


Quality:  mild to moderate


Radiation:  non-radiation


Severity:  moderate


Onset quality:  gradual


Timing of current episode:  intermittent


Progression:  unchanged


Chronicity:  new


Context:  recent illness, recent surgery, recent immobilization, trauma/injury, 

new medications, hx of DVT/PE, non-compliance w/ medications


Relieving factors:  rest


Exacerbating factors:  movement


Associated symptoms:  nausea/vomiting


Treatments prior to arrival:  none





Past Medical/Family History


Physician Review


I have reviewed the patient's past medical and family history.  Any updates have

been documented here.





Past Medical History


Recent Fever:  No


Clinical Suspicion of Infectio:  No


New/Unexplained Change in Ment:  No


Past Medical History:  None


Past Surgical History:  None





Social History


Smoking Cessation:  Never Smoker


Counseling Performed:  No


Alcohol Use:  None


Any Illegal Drug Use:  No


TB Exposure/Symptoms:  No


Physically hurt or threatened:  No





Other


Last Tetanus:  UNK


Any Pre-Existing Lines (PICC,:  No


Is patient up to date on immun:  No


Last Flu:  UNK


Last Pneumovax:  UNK





Review of Systems


Review of Systems


Constitutional:  no symptoms


EENTM:  as per HPI


Cardiovascular:  no symptoms


Respiratory:  no symptoms


Gastrointestinal:  as per HPI


Genitourinary:  no symptoms


Musculoskeletal:  no symptoms


Neurological:  as per HPI


Psychological:  no symptoms


Endocrine:  no symptoms


Hematological/Lymphatic:  no symptoms


Review of other systems


All other systems reviewed and negative.





Physical Exam


Related Data


Allergies:  


Coded Allergies:  


     No Known Allergies (Unverified , 5/16/20)


Triage Vital Signs





Vital Signs








  Date Time  Temp Pulse Resp B/P (MAP) Pulse Ox O2 Delivery O2 Flow Rate FiO2


 


5/16/20 20:05 99.0 138 18 151/70 100   











Physical Exam


CONSTITUTIONAL





Constitutional:  well-developed, well-nourished


HENT


HENT:  normocephalic, atraumatic, oropharynx clear/moist, nose normal, other 

(dry mm)


HENT L/R:  left ext ear normal, right ext ear normal, left bulging TM (erythema)


EYES





Eyes:  PERRL, conjunctivae normal, EOM normal (fatigueable horizontal nystagmus)


NECK


Neck:  ROM normal


PULMONARY


Pulmonary:  effort normal, breath sounds normal


CARDIOVASCULAR





Cardiovascular:  regular rhythm, heart sounds normal, capillary refill normal, 

normal rate


GASTROINTESTINAL





Abdominal:  soft, nontender, bowel sounds normal


GENITOURINARY





Genitourinary:  exam deferred


SKIN


Skin:  warm, dry


MUSCULOSKELETAL





Musculoskeletal:  ROM normal


NEUROLOGICAL





Neurological:  alert, oriented x 3, no gross motor or sensory deficits, other (

fatigueable horizontal nystagmus)


PSYCHOLOGICAL


Psychological:  mood/affect normal, judgement normal





Results


Laboratory


Lab results reviewed:  Yes (normal cbc and cmp except k=3.2)





Diagnostics Tests


Diagnostic test(s) reviewed:  Yes (ekg  sinus tachycardia, hr 131,  normal axis,

normal st/twaves)





Critical Care Time


Subsequent provider


I assumed direction of critical care for this patient from another provider of 

my specialty.





Assessment & Plan


Assessment & Plan


Problems:  


(1) Vertigo


(2) Dehydration


(3) Anxiety


(4) Otitis media


(5) Hypokalemia


Assessment & Plan


meclizine, azithromycin,drink plenty of water, eat plenty of bananas. follow up 

with your family doctor





Reassessment


Reassessment time:  21:05


Reassessment


symptoms resolved


Depart Disposition:  HOME, SELF-CARE


Last Vital Signs











  Date Time  Temp Pulse Resp B/P (MAP) Pulse Ox O2 Delivery O2 Flow Rate FiO2


 


5/16/20 20:05 99.0 138 18 151/70 100   

















DESMOND IVORY              May 16, 2020 20:39

## 2020-05-16 NOTE — XMS REPORT
Summary of Care: 3/8/16 - 3/8/16

                             Created on: 2064



LE KEY

External Reference #: [id number]

: 1995

Sex: Male



Demographics





                          Address                   [address]

[place]  [address]-

 

                          Home Phone                [phone]

 

                          Preferred Language        English

 

                          Marital Status            Single

 

                          Taoism Affiliation     None

 

                          Race                      Other

 

                          Ethnic Group              /Latin





Author





                          Author                    [facility]

ospital

 

                          Organization              [facility]

[facility]

 

                          Address                   Unknown

 

                          Phone                     Unavailable







Encounter





MIKEL Greenwood(JOSIE) [id number] Date(s): 3/8/16 - 3/8/16

[facility] [address] [place] [address]-     [phone]
[phone]

Discharge Diagnosis: Food poisoning

Discharge Diagnosis: Acute infective gastroenteritis

Discharge Disposition: Home

Attending Physician: Con Fried MD





Vital Signs





                    1                   2                   3



                                         Most recent to   



                                         oldest [Reference   



                                         Range]:   

 

                                        165.1 cm 

                    (3/8/16 4:10 PM)                         



                                         Height   

 

                                        98.5 DegF 

                          (3/8/16 10:36 PM)         99.1 DegF 

                          (3/8/16 9:05 PM)          98.2 DegF 

                                        (3/8/16 4:10 PM)



                                         Temperature Oral   



                                         [96.4-99.1 DegF]   

 

                                        125/76 mmHg 

                          (3/8/16 10:36 PM)         113/62 mmHg 

                          (3/8/16 9:05 PM)          110/62 mmHg 

                                        (3/8/16 4:10 PM)



                                         Blood Pressure   



                                         [/60-90 mmHg]   

 

                                        16 BRMIN 

                          (3/8/16 10:36 PM)         18 BRMIN 

                          (3/8/16 9:05 PM)          20 BRMIN 

                                        (3/8/16 4:10 PM)



                                         Respiratory Rate   



                                         [14-20 BRMIN]   

 

                                        119 bpm 

*HI*

                          (3/8/16 10:36 PM)         125 bpm 

*HI*

                          (3/8/16 9:05 PM)          127 bpm 

*HI*

                                        (3/8/16 4:10 PM)



                                         Peripheral Pulse   



                                         Rate [ bpm]   

 

                                        100 kg 

                    (3/8/16 4:10 PM)                         



                                         Weight   

 

                                        36.69 m2 

                    (3/8/16 4:10 PM)                         



                                         Body Mass Index   







Problem List





No data available for this section



Allergies, Adverse Reactions, Alerts





   



                 Substance       Reaction        Severity        Status

 

   



                           NKDA                      Active







Medications





Bentyl

20 mg, 2 mL, Route: IM, Drug form: INJ, ONCE, Dosing Weight 100, kg, Start date:
16 16:20:00, Stop date: 16 16:20:00

Notes: (Same as: Bentyl)

Start Date: 3/8/16

Stop Date: 3/8/16

Status: Completed



Lomotil oral tablet

1 tab, PO, QID, PRN for loose stool, X 7 day, # 28 tab, 0 Refill(s)

Start Date: 3/8/16

Stop Date: 3/15/16

Status: Ordered



morphine Sulfate

4 mg, 2 mL, Route: IVP, Drug form: INJ, ONCE, Dosing Weight 100, kg, Priority: S
TAT, Start date: 16 16:20:00, Stop date: 16 16:20:00

Notes: (Same as:MORPhine Sulfate)

Start Date: 3/8/16

Stop Date: 3/8/16

Status: Completed



ondansetron

4 mg, 2 mL, Route: IVP, Drug form: INJ, ONCE, Dosing Weight 100, kg, Priority: S
TAT, Start date: 16 16:20:00, Stop date: 16 16:20:00

Notes: (Same as: Zofran)  *** MEDICATION WASTE ***Product Size:  4 mgProduct Was
spencer:  ___ mg

Start Date: 3/8/16

Stop Date: 3/8/16

Status: Completed



promethazine 25 mg oral tablet

25 mg = 1 tab, PO, Q8H, PRN Nausea/Vomiting, X 10 day, # 30 tab, 0 Refill(s)

Start Date: 3/8/16

Stop Date: 3/18/16

Status: Ordered



Saline Flush 0.9%

10 mL, Route: IVP, Drug Form: INJ, Dosing Weight 100, kg, PRN, PRN Line Flush, S
tart date: 16 16:20:00, Duration: 30 day, Stop date: 16 17:19:00

Notes: (Same as: BD Posiflush)

Start Date: 3/8/16

Stop Date: 3/9/16

Status: Discontinued



Sodium Chloride 0.9% (Bolus) IV

1,000 mL, 1,000 ml/hr, Infuse Over: 1 Hour, Route: IV, ONCE, Priority: STAT, Dos
ing Weight 100 kg, Start date: 16 21:21:00, Duration: 1 doses or times, St
op date: 16 21:21:00

Start Date: 3/8/16

Stop Date: 3/8/16

Status: Completed



Sodium Chloride 0.9% (Bolus) IV

1,000 mL, 1000 ml/hr, Infuse Over: 1 hr, Route: IV, 1,000, Drug form: INJ, ONCE,
Priority: STAT, Dosing Weight 100 kg, Start date: 16 16:20:00, Duration: 1
doses or times, Stop date: 16 16:20:00

Start Date: 3/8/16

Stop Date: 3/8/16

Status: Completed



Sodium Chloride 0.9% (Bolus) IV

1,000 mL, 1,000 ml/hr, Infuse Over: 1 hr, Route: IV, ONCE, Priority: STAT, Dosin
g Weight 100 kg, Start date: 16 21:12:00, Duration: 1 doses or times, Stop
date: 16 21:12:00

Start Date: 3/8/16

Stop Date: 3/8/16

Status: Completed



Results





ELECTROLYTES



 



                           Most recent to            1



                                         oldest [Reference 



                                         Range]: 

 

 



                           Sodium Lvl [135-145       136 mEq/L



                           mEq/L]                    (3/8/16 4:38 PM)

 

 



                           Potassium Lvl             4.0 mEq/L



                           [3.5-5.1 mEq/L]           (3/8/16 4:38 PM)

 

 



                           Chloride Lvl [      104 mEq/L



                           mEq/L]                    (3/8/16 4:38 PM)

 

 



                           CO2 [24-32 mEq/L]         23 mEq/L



                                         *LOW*



                                         (3/8/16 4:38 PM)

 

 



                           AGAP [10.0-20.0           13.0 mEq/L



                           mEq/L]                    (3/8/16 4:38 PM)







CHEM PANEL



 



                           Most recent to            1



                                         oldest [Reference 



                                         Range]: 

 

 



                           Creatinine Lvl            1.07 mg/dL



                           [0.50-1.40 mg/dL]         (3/8/16 4:38 PM)

 

 



                           eGFR                      99 mL/min/1.73m2 1



                                         *NA*



                                         (3/8/16 4:38 PM)

 

 



                           BUN [7-22 mg/dL]          19 mg/dL



                                         (3/8/16 4:38 PM)

 

 



                           B/C Ratio [6-25]          18



                                         (3/8/16 4:38 PM)

 

 



                           Glucose Lvl [70-99        118 mg/dL



                           mg/dL]                    *HI*



                                         (3/8/16 4:38 PM)

 

 



                           Total Protein             8.7 g/dL



                           [6.4-8.4 g/dL]            *HI*



                                         (3/8/16 4:38 PM)

 

 



                           Albumin Lvl [3.5-5.0      4.5 g/dL



                           g/dL]                     (3/8/16 4:38 PM)

 

 



                           Globulin [2.0-4.0         4.2 g/dL



                           g/dL]                     *HI*



                                         (3/8/16 4:38 PM)

 

 



                           A/G Ratio [0.7-1.6]       1.1



                                         (3/8/16 4:38 PM)

 

 



                           Calcium Lvl               9.1 mg/dL



                           [8.5-10.5 mg/dL]          (3/8/16 4:38 PM)

 

 



                           ALT [0-65 unit/L]         58 unit/L



                                         (3/8/16 4:38 PM)

 

 



                           AST [0-37 unit/L]         20 unit/L



                                         (3/8/16 4:38 PM)

 

 



                           Alk Phos [          149 unit/L



                           unit/L]                   *HI*



                                         (3/8/16 4:38 PM)

 

 



                           Bili Total [0.2-1.3       1.0 mg/dL



                           mg/dL]                    (3/8/16 4:38 PM)

 

 



                           Lipase Lvl [        116 unit/L



                           unit/L]                   (3/8/16 4:38 PM)







1Result Comment: The eGFR is calculated using the CKD-EPI formula. In most 
young, healthy individuals the eGFR will be >90 mL/min/1.73m2. The eGFR declines
with age. An eGFR of 60-89 may be normal in some populations, particularly the 
elderly, for whom the CKD-EPI formula has not been extensively validated. Use of
the eGFR is not recommended in the following populations:



Individuals with unstable creatinine concentrations, including pregnant patients
and those with serious co-morbid conditions.



Patients with extremes in muscle mass or diet. 



The data above are obtained from the National Kidney Disease Education Program (
NKDEP) which additionally recommends that when the eGFR is used in patients with
extremes of body mass index for purposes of drug dosing, the eGFR should be mul
tiplied by the estimated BMI.



HEMATOLOGY



 



                           Most recent to            1



                                         oldest [Reference 



                                         Range]: 

 

 



                           WBC [3.7-10.4 K/CMM]      17.1 K/CMM



                                         *HI*



                                         (3/8/16 4:38 PM)

 

 



                           RBC [4.70-6.10            6.20 M/CMM



                           M/CMM]                    *HI*



                                         (3/8/16 4:38 PM)

 

 



                           Hgb [14.0-18.0 g/dL]      17.7 g/dL



                                         (3/8/16 4:38 PM)

 

 



                           Hct [42.0-54.0 %]         52.3 %



                                         (3/8/16 4:38 PM)

 

 



                           MCV [80.0-94.0 fL]        84.4 fL



                                         (3/8/16 4:38 PM)

 

 



                           MCH [27.0-31.0 pg]        28.5 pg



                                         (3/8/16 4:38 PM)

 

 



                           MCHC [32.0-36.0           33.8 g/dL



                           g/dL]                     (3/8/16 4:38 PM)

 

 



                           RDW [11.5-14.5 %]         13.7 %



                                         (3/8/16 4:38 PM)

 

 



                           Platelet [133-450         261 K/CMM



                           K/CMM]                    (3/8/16 4:38 PM)

 

 



                           MPV [7.4-10.4 fL]         8.2 fL



                                         (3/8/16 4:38 PM)

 

 



                           Segs [45.0-75.0 %]        89.9 %



                                         *HI*



                                         (3/8/16 4:38 PM)

 

 



                           Lymphocytes               5.1 %



                           [20.0-40.0 %]             *LOW*



                                         (3/8/16 4:38 PM)

 

 



                           Monocytes [2.0-12.0       4.5 %



                           %]                        (3/8/16 4:38 PM)

 

 



                           Eosinophils [0.0-4.0      0.3 %



                           %]                        (3/8/16 4:38 PM)

 

 



                           Basophils [0.0-1.0        0.2 %



                           %]                        (3/8/16 4:38 PM)

 

 



                           Segs-Bands #              15.4 K/CMM



                           [1.5-8.1 K/CMM]           *HI*



                                         (3/8/16 4:38 PM)

 

 



                           Lymphocytes #             0.9 K/CMM



                           [1.0-5.5 K/CMM]           *LOW*



                                         (3/8/16 4:38 PM)

 

 



                           Monocytes # [0.0-0.8      0.8 K/CMM



                           K/CMM]                    (3/8/16 4:38 PM)







Immunizations





No data available for this section



Procedures





No data available for this section



Social History





 



                           Social History Type       Response

 

 



                           Smoking Status            Never smoker; Type: Cigaret

candace; Exposure to Tobacco Smoke None;

Cigarette



                                         Smoking Last 365 Days No; Reg Smoking C

essation Counseling No







Assessment and Plan





No data available for this section

## 2020-05-16 NOTE — XMS REPORT
Encounter CCD: 2013 to 2013

                             Created on: 10/27/2059



LE KEY

External Reference #: [id number]

: 1995

Sex: Male



Demographics





                          Address                   [address] [place] [address]-

 

                          Home Phone                [phone]

 

                          Preferred Language        Unknown

 

                          Marital Status            Single

 

                          Anglican Affiliation     None

 

                          Race                      Other

 

                          Ethnic Group              /Latin





Author





                          Author                    [facility]

 

                          [facility]

 

                          Address                   Unknown

 

                          Phone                     Unavailable







Care Team Providers





                    Care Team Member Name Role                Phone

 

                    Dorian Lovett CP                  [phone]







Allergies, Adverse Reactions, Alerts





  



                     Substance           Reaction            Status

 

  



                           NKDA                      Active







Medications





    



              Medication   Instructions  Start Date   End Date     Status

 

    



              Zofran       4 mg, 2 mL, Route: IVP, Drug form:  2013   Completed



                                         INJ, ONCE, Dosing Weight 87.273,   



                                         kg, Priority: STAT, Start date:   



                                         13 5:34:00, Stop date:   



                                         13 5:34:00(Same as: Zofran)   

 

    



                 Sodium Chloride 0.9%  1,000 mL, Rate: 1,000 ml/hr, Infuse  2013                               Completed



                           (Bolus) IV 1,000 mL       over: 1 hr, Route: IV, Dosi

ng   



                                         Weight 87.273 kg, Total Volume:   



                                         1,000, Priority: STAT, Start date:   



                                         13 5:34:00, Duration: 1 doses   



                                         or times, Stop date: 13   



                                         6:33:00, Bolus Dose   



                                         Bolus Dose   

 

    



                 Zofran ODT 4 mg oral  4 mg = 1 tab, PO, BID, Nausea and  2013      Ordered



                           tablet,                   Vomiting, Dissolve tab unde

r   



                           disintegrating            tongue, # 10 tab, 0 Refill(

s)   



                                         Dissolve tab under tongue   







Vital Signs





                    Most recent to oldest [Reference Range]: 1                  

 2

 

                          Height                    165.1 cm 

                          (2013 03:41:00)      

 

                          Temperature Oral [96.4-99.1 DegF] 98.5 DegF 

                          (2013 06:55:00)     99.1 DegF 

                                        (2013 03:41:00)  

 

                          Systolic Blood Pressure [ mmHg] 120 mmHg 

                          (2013 06:55:00)     122 mmHg 

                                        (2013 03:41:00)  

 

                          Diastolic Blood Pressure [60-90 mmHg] 78 mmHg 

                          (2013 06:55:00)     84 mmHg 

                                        (2013 03:41:00)  

 

                          Respiratory Rate [14-20 BRMIN] 18 BRMIN 

                          (2013 06:55:00)     18 BRMIN 

                                        (2013 03:41:00)  

 

                          Peripheral Pulse Rate [ bpm] 88 bpm 

                          (2013 06:55:00)     122 bpm 

*HI*

                                        (2013 03:41:00)  

 

                          Weight                    87.273 kg 

                          (2013 03:41:00)

## 2020-05-16 NOTE — XMS REPORT
Encounter CCD: 2012 to 2012

                             Created on: 10/07/2092



LE KEY

External Reference #: [id number]

: 1995

Sex: Male



Demographics





                          Address                   [address] [place]  [address]-

 

                          Home Phone                [phone]

 

                          Preferred Language        Unknown

 

                          Marital Status            Single

 

                          Church Affiliation     None

 

                          Race                      Other

 

                          Ethnic Group              /Latin





Author





                          Author                    [employer] [facility] White              [facility]

 

                          Address                   Unknown

 

                          Phone                     Unavailable







Care Team Providers





                    Care Team Member Name Role                Phone

 

                    Gualberto Norton CP                  [phone]







Allergies, Adverse Reactions, Alerts





  



                     Substance           Reaction            Status

 

  



                           NKDA                      Active







Vital Signs





                    Most recent to oldest [Reference Range]: 1                  

 2

 

                          Height                    165.10 cm 

                          (2012 18:39:00)      

 

                          Temperature Oral [96.8-99.7 DegF] 97.7 DegF 

                          (2012 23:55:00)     98.8 DegF 

                                        (2012 18:39:00)  

 

                          Systolic Blood Pressure [ mmHg] 134 mmHg 

                          (2012 23:55:00)     138 mmHg 

                                        (2012 18:39:00)  

 

                          Diastolic Blood Pressure [45-84 mmHg] 75 mmHg 

                          (2012 23:55:00)     68 mmHg 

                                        (2012 18:39:00)  

 

                          Respiratory Rate [14-20 BRMIN] 20 BRMIN 

                          (2012 23:55:00)     20 BRMIN 

                                        (2012 18:39:00)  

 

                          Peripheral Pulse Rate [55-90 bpm] 100 bpm 

*HI*

                          (2012 23:55:00)     111 bpm 

*HI*

                                        (2012 18:39:00)  

 

                          Weight                    94.545 kg 

                          (2012 18:39:00)

## 2020-05-16 NOTE — XMS REPORT
Encounter CCD: 2012 to 2012

                             Created on: 07/15/2014



LE KEY

External Reference #: [id number]

: 1995

Sex: Male



Demographics





                          Address                   [address] [place]  [address]-

 

                          Home Phone                [phone]

 

                          Preferred Language        Unknown

 

                          Marital Status            Single

 

                          Islam Affiliation     None

 

                          Race                      Other

 

                          Ethnic Group              /Latin





Author





                          Author                    [employer] [facility] White              [facility]

 

                          Address                   Unknown

 

                          Phone                     Unavailable







Care Team Providers





                    Care Team Member Name Role                Phone

 

                    Marleny Bush      CP                  [phone]







Allergies, Adverse Reactions, Alerts





  



                     Substance           Reaction            Status

 

  



                           NKDA                      Active







Medications





    



              Medication   Instructions  Start Date   End Date     Status

 

    



              Norco 5/325 oral  1-2 tab, PO, Q4-6H, PRN, 30 tab,  2012   0

3/08/2012   

Ordered



                           tablet                    Pain, Substitution Allowed,

   



                                         Maintenance   

 

    



              Toradol 30 mg/mL  30 mg, Route: IV, Drug form: INJ,  2012   

Discontinued



                           injectable solution       ONCE, Start date: 12 

  



                                         21:35:00, Stop date: 12   



                                         21:35:00   

 

    



              Toradol 30 mg/mL  60 mg, Route: IM, Drug form: INJ,  2012   

Completed



                           injectable solution       ONCE, Start date: 12 

  



                                         21:41:00, Stop date: 12   



                                         21:41:00   

 

    



                 Toradol 10 mg oral  10 mg, 1 tab, PO, Q4H, PRN, 30 tab,  2012      Ordered



                           tablet                    Pain, Substitution Allowed,

 TAB   

 

    



                 Flomax 0.4 mg oral  0.4 mg, 1 cap, PO, Daily, 10 cap,  20

12      Ordered



                           capsule                   Substitution Allowed, CAP  

 







Vital Signs





                          Most recent to oldest [Reference Range]: 1

 

                          Height                    165.10 cm 

                                        (2012 21:10:00)  

 

                          Weight                    92.727 kg 

                                        (2012 21:10:00)